# Patient Record
Sex: MALE | Race: WHITE | NOT HISPANIC OR LATINO | Employment: UNEMPLOYED | URBAN - NONMETROPOLITAN AREA
[De-identification: names, ages, dates, MRNs, and addresses within clinical notes are randomized per-mention and may not be internally consistent; named-entity substitution may affect disease eponyms.]

---

## 2019-12-13 ENCOUNTER — APPOINTMENT (EMERGENCY)
Dept: RADIOLOGY | Facility: HOSPITAL | Age: 44
End: 2019-12-13
Payer: COMMERCIAL

## 2019-12-13 ENCOUNTER — HOSPITAL ENCOUNTER (EMERGENCY)
Facility: HOSPITAL | Age: 44
Discharge: HOME | End: 2019-12-14
Attending: EMERGENCY MEDICINE | Admitting: EMERGENCY MEDICINE
Payer: COMMERCIAL

## 2019-12-13 VITALS
HEART RATE: 97 BPM | DIASTOLIC BLOOD PRESSURE: 78 MMHG | WEIGHT: 167.77 LBS | SYSTOLIC BLOOD PRESSURE: 136 MMHG | RESPIRATION RATE: 18 BRPM | OXYGEN SATURATION: 95 % | TEMPERATURE: 96.8 F | HEIGHT: 70 IN | BODY MASS INDEX: 24.02 KG/M2

## 2019-12-13 DIAGNOSIS — M54.50 ACUTE LOW BACK PAIN: Primary | ICD-10-CM

## 2019-12-13 LAB — S PYO DNA THROAT QL NAA+PROBE: NORMAL

## 2019-12-13 PROCEDURE — 72100 X-RAY EXAM L-S SPINE 2/3 VWS: CPT

## 2019-12-13 PROCEDURE — 99284 EMERGENCY DEPT VISIT MOD MDM: CPT | Performed by: EMERGENCY MEDICINE

## 2019-12-13 PROCEDURE — 99284 EMERGENCY DEPT VISIT MOD MDM: CPT

## 2019-12-13 PROCEDURE — 87651 STREP A DNA AMP PROBE: CPT | Performed by: EMERGENCY MEDICINE

## 2019-12-13 RX ORDER — NAPROXEN 500 MG/1
500 TABLET ORAL 2 TIMES DAILY WITH MEALS
Qty: 10 TABLET | Refills: 0 | Status: SHIPPED | OUTPATIENT
Start: 2019-12-13 | End: 2021-07-18

## 2019-12-13 RX ORDER — OXYCODONE HYDROCHLORIDE AND ACETAMINOPHEN 5; 325 MG/1; MG/1
1 TABLET ORAL ONCE
Status: COMPLETED | OUTPATIENT
Start: 2019-12-13 | End: 2019-12-13

## 2019-12-13 RX ORDER — ECHINACEA PURPUREA EXTRACT 125 MG
1 TABLET ORAL ONCE
Status: DISCONTINUED | OUTPATIENT
Start: 2019-12-13 | End: 2019-12-14 | Stop reason: HOSPADM

## 2019-12-13 RX ORDER — NAPROXEN 500 MG/1
500 TABLET ORAL ONCE
Status: COMPLETED | OUTPATIENT
Start: 2019-12-13 | End: 2019-12-13

## 2019-12-13 RX ORDER — TRAMADOL HYDROCHLORIDE 50 MG/1
50 TABLET ORAL EVERY 6 HOURS PRN
Qty: 10 TABLET | Refills: 0 | Status: SHIPPED | OUTPATIENT
Start: 2019-12-13 | End: 2021-07-18

## 2019-12-13 RX ORDER — CYCLOBENZAPRINE HCL 10 MG
10 TABLET ORAL 3 TIMES DAILY PRN
Qty: 30 TABLET | Refills: 0 | Status: SHIPPED | OUTPATIENT
Start: 2019-12-13 | End: 2021-07-18

## 2019-12-13 RX ORDER — LORAZEPAM 1 MG/1
2 TABLET ORAL ONCE
Status: COMPLETED | OUTPATIENT
Start: 2019-12-13 | End: 2019-12-13

## 2019-12-13 RX ORDER — ONDANSETRON 4 MG/1
4 TABLET, ORALLY DISINTEGRATING ORAL ONCE
Status: COMPLETED | OUTPATIENT
Start: 2019-12-13 | End: 2019-12-13

## 2019-12-13 RX ADMIN — OXYCODONE HYDROCHLORIDE AND ACETAMINOPHEN 1 TABLET: 5; 325 TABLET ORAL at 23:17

## 2019-12-13 RX ADMIN — NAPROXEN 500 MG: 500 TABLET ORAL at 23:16

## 2019-12-13 RX ADMIN — ONDANSETRON 4 MG: 4 TABLET, ORALLY DISINTEGRATING ORAL at 23:17

## 2019-12-13 RX ADMIN — LORAZEPAM 2 MG: 1 TABLET ORAL at 23:16

## 2019-12-14 NOTE — ED PROVIDER NOTES
History  Chief Complaint   Patient presents with    Back Pain     back pain started about 2115, stated felt a popping in lower back and fell down to the ground from the standing postion, Denies hitting head  Feels he also has a lump in his throat started about 3 days ago  HPI     Pt presents from home c/o low back pain, midline, no radiation, began while the pt was going from a sitting to standing, felt a "popping" in his back, and he went back down to his knees  No injuries noted  No head trauma or LOC  Pt also with cough, congestion, sore throat and malaise as well  Pt denies ha, fevers, cp, sob, n/v/d/c, abd pain, dysuria, focal def or syncope  Prior to Admission Medications   Prescriptions Last Dose Informant Patient Reported? Taking?   diazepam (VALIUM) 5 mg tablet Not Taking at Unknown time  No No   Sig: Take 1 tablet by mouth every 12 (twelve) hours as needed for muscle spasms for up to 10 days   Patient not taking: Reported on 12/13/2019   naproxen (NAPROSYN) 500 mg tablet   No No   Sig: Take 1 tablet by mouth 2 (two) times a day with meals for 7 days      Facility-Administered Medications: None       Past Medical History:   Diagnosis Date    Kidney disorder     scarring    Sciatica        History reviewed  No pertinent surgical history  History reviewed  No pertinent family history  I have reviewed and agree with the history as documented  Social History     Tobacco Use    Smoking status: Current Every Day Smoker     Packs/day: 1 00    Smokeless tobacco: Former User   Substance Use Topics    Alcohol use: Yes     Comment: socially     Drug use: No        Review of Systems   Constitutional: Negative for activity change, appetite change and fever  HENT: Positive for congestion, rhinorrhea and sore throat  Negative for nosebleeds  Eyes: Negative for photophobia and discharge  Respiratory: Positive for cough  Negative for shortness of breath, wheezing and stridor  Cardiovascular: Negative for chest pain  Gastrointestinal: Negative for abdominal pain, constipation, diarrhea, nausea and vomiting  Endocrine: Negative for cold intolerance and polydipsia  Genitourinary: Negative for discharge, hematuria and penile pain  Musculoskeletal: Positive for back pain  Negative for arthralgias, myalgias and neck stiffness  Skin: Negative for color change and rash  Allergic/Immunologic: Negative for immunocompromised state  Neurological: Negative for dizziness, seizures and headaches  Hematological: Negative for adenopathy  Psychiatric/Behavioral: Negative for confusion and self-injury  The patient is not nervous/anxious  Physical Exam  Physical Exam   Constitutional: He is oriented to person, place, and time  He appears well-developed and well-nourished  No distress  HENT:   Head: Normocephalic and atraumatic  Right Ear: External ear normal    Left Ear: External ear normal    Nose: Nose normal    Mouth/Throat: Oropharynx is clear and moist  No oropharyngeal exudate  Nasal congestion and post-nasal drip  Eyes: Pupils are equal, round, and reactive to light  Conjunctivae and EOM are normal  Right eye exhibits no discharge  Left eye exhibits no discharge  No scleral icterus  Neck: Normal range of motion  Neck supple  No JVD present  No tracheal deviation present  No thyromegaly present  Cardiovascular: Normal rate, regular rhythm, normal heart sounds and intact distal pulses  Exam reveals no gallop and no friction rub  No murmur heard  Pulmonary/Chest: Breath sounds normal  No stridor  No respiratory distress  He has no wheezes  He has no rales  He exhibits no tenderness  Abdominal: Soft  Bowel sounds are normal  He exhibits no distension and no mass  There is no tenderness  There is no rebound and no guarding  Musculoskeletal: Normal range of motion  He exhibits tenderness  He exhibits no edema or deformity          Arms:  Lymphadenopathy:     He has no cervical adenopathy  Neurological: He is alert and oriented to person, place, and time  He has normal reflexes  He displays normal reflexes  No cranial nerve deficit  He exhibits normal muscle tone  Coordination normal    Skin: Skin is warm and dry  No rash noted  He is not diaphoretic  No erythema  No pallor  Psychiatric: He has a normal mood and affect  His behavior is normal  Judgment and thought content normal    Nursing note and vitals reviewed  Vital Signs  ED Triage Vitals [12/13/19 2214]   Temperature Pulse Respirations Blood Pressure SpO2   (!) 96 8 °F (36 °C) 97 18 136/78 95 %      Temp Source Heart Rate Source Patient Position - Orthostatic VS BP Location FiO2 (%)   Temporal Monitor Sitting Right arm --      Pain Score       7           Vitals:    12/13/19 2214   BP: 136/78   Pulse: 97   Patient Position - Orthostatic VS: Sitting         Visual Acuity      ED Medications  Medications   ondansetron (ZOFRAN-ODT) dispersible tablet 4 mg (4 mg Oral Given 12/13/19 2317)   naproxen (NAPROSYN) tablet 500 mg (500 mg Oral Given 12/13/19 2316)   oxyCODONE-acetaminophen (PERCOCET) 5-325 mg per tablet 1 tablet (1 tablet Oral Given 12/13/19 2317)   LORazepam (ATIVAN) tablet 2 mg (2 mg Oral Given 12/13/19 2316)       Diagnostic Studies  Results Reviewed     Procedure Component Value Units Date/Time    Strep A PCR [[id number]]  (Normal) Collected:  12/13/19 2317    Lab Status:  Final result Specimen:  Throat Updated:  12/13/19 2351     STREP A PCR None Detected                 XR spine lumbar 2 or 3 views injury   Final Result by Kaylen Hudson MD (12/14 2488)      No acute osseous abnormality  Degenerative changes as described           Workstation performed: [id number]                    Procedures  Procedures         ED Course                               MDM  Number of Diagnoses or Management Options  Acute low back pain:   Diagnosis management comments: IMP: viral uri, strep throat, lumbar strain versus radiculopathy  Doubt pneumonia, deep space infection, cauda equina or acute neurosurgical process  Plan: check xray lumbar spine, strep screen, give po narcotic, benzo and anti-inflammatory prn   - xray no acute  - strep PCR neg  - Pt with likely viral URI and lumbar strain/pain  Pt is improved and will f/up w/ his pcp  Amount and/or Complexity of Data Reviewed  Clinical lab tests: ordered and reviewed  Tests in the radiology section of CPT®: ordered and reviewed  Tests in the medicine section of CPT®: ordered and reviewed  Decide to obtain previous medical records or to obtain history from someone other than the patient: yes  Review and summarize past medical records: yes  Independent visualization of images, tracings, or specimens: yes    Risk of Complications, Morbidity, and/or Mortality  Presenting problems: high  Diagnostic procedures: low  Management options: low    Patient Progress  Patient progress: improved        Disposition  Final diagnoses:   Acute low back pain     Time reflects when diagnosis was documented in both MDM as applicable and the Disposition within this note     Time User Action Codes Description Comment    12/13/2019 11:46 PM Jesse Hackett Add [M54 5] Acute low back pain       ED Disposition     ED Disposition Condition Date/Time Comment    Discharge Stable Fri Dec 13, 2019 11:46 PM Saray Sunshine discharge to home/self care  Follow-up Information     Follow up With Specialties Details Why Contact Info Additional Information    [address] Schedule an appointment as soon as possible for a visit in 2 days As needed    Return immediately, If symptoms worsen [address] [facility] [place] [address] [place]  [id number]  [phone] [facility], [address] [place], [place], [address]          Discharge Medication List as of 12/13/2019 11:50 PM      START taking these medications    Details cyclobenzaprine (FLEXERIL) 10 mg tablet Take 1 tablet (10 mg total) by mouth 3 (three) times a day as needed for muscle spasms, Starting Fri 12/13/2019, Until Sun 1/12/2020, Normal      traMADol (ULTRAM) 50 mg tablet Take 1 tablet (50 mg total) by mouth every 6 (six) hours as needed for moderate pain for up to 10 doses, Starting Fri 12/13/2019, Normal         CONTINUE these medications which have CHANGED    Details   naproxen (NAPROSYN) 500 mg tablet Take 1 tablet (500 mg total) by mouth 2 (two) times a day with meals for 10 doses, Starting Fri 12/13/2019, Until Wed 12/18/2019, Normal         CONTINUE these medications which have NOT CHANGED    Details   diazepam (VALIUM) 5 mg tablet Take 1 tablet by mouth every 12 (twelve) hours as needed for muscle spasms for up to 10 days, Starting 12/28/2016, Until Sat 1/7/17, Print               ED Provider  Electronically Signed by           Solis Cao DO  12/16/19 1528

## 2019-12-16 ENCOUNTER — TELEPHONE (OUTPATIENT)
Dept: PHYSICAL THERAPY | Facility: OTHER | Age: 44
End: 2019-12-16

## 2019-12-16 NOTE — TELEPHONE ENCOUNTER
Nurse attempted to reach patient and father Wilfredo Paul answered  He is listed as approved contact in EMR  Message to be given to patient along with our contact information for f/u/return call      Referral closed

## 2020-07-22 ENCOUNTER — HOSPITAL ENCOUNTER (EMERGENCY)
Facility: HOSPITAL | Age: 45
Discharge: LEFT BEFORE DISCHARGE | End: 2020-07-22
Attending: EMERGENCY MEDICINE | Admitting: EMERGENCY MEDICINE
Payer: COMMERCIAL

## 2020-07-22 VITALS
SYSTOLIC BLOOD PRESSURE: 136 MMHG | RESPIRATION RATE: 20 BRPM | OXYGEN SATURATION: 96 % | DIASTOLIC BLOOD PRESSURE: 90 MMHG | BODY MASS INDEX: 24.39 KG/M2 | WEIGHT: 170 LBS | HEART RATE: 128 BPM | TEMPERATURE: 98.6 F

## 2020-07-22 DIAGNOSIS — F41.9 ANXIETY: Primary | ICD-10-CM

## 2020-07-22 PROCEDURE — 99281 EMR DPT VST MAYX REQ PHY/QHP: CPT | Performed by: EMERGENCY MEDICINE

## 2020-07-22 PROCEDURE — 99283 EMERGENCY DEPT VISIT LOW MDM: CPT

## 2020-07-23 ENCOUNTER — HOSPITAL ENCOUNTER (EMERGENCY)
Facility: HOSPITAL | Age: 45
Discharge: HOME | End: 2020-07-23
Attending: EMERGENCY MEDICINE | Admitting: EMERGENCY MEDICINE
Payer: COMMERCIAL

## 2020-07-23 VITALS
TEMPERATURE: 97.7 F | BODY MASS INDEX: 24.34 KG/M2 | OXYGEN SATURATION: 93 % | HEIGHT: 70 IN | DIASTOLIC BLOOD PRESSURE: 63 MMHG | SYSTOLIC BLOOD PRESSURE: 113 MMHG | RESPIRATION RATE: 28 BRPM | WEIGHT: 170 LBS | HEART RATE: 91 BPM

## 2020-07-23 DIAGNOSIS — F19.929 DRUG INTOXICATION (HCC): Primary | ICD-10-CM

## 2020-07-23 LAB
ALBUMIN SERPL BCP-MCNC: 3.7 G/DL (ref 3.5–5.7)
ALBUMIN SERPL BCP-MCNC: 4.2 G/DL (ref 3.5–5.7)
ALP SERPL-CCNC: 41 U/L (ref 40–150)
ALP SERPL-CCNC: 48 U/L (ref 40–150)
ALT SERPL W P-5'-P-CCNC: 37 U/L (ref 7–52)
ALT SERPL W P-5'-P-CCNC: 41 U/L (ref 7–52)
AMPHETAMINES SERPL QL SCN: POSITIVE
ANION GAP SERPL CALCULATED.3IONS-SCNC: 11 MMOL/L (ref 4–13)
ANION GAP SERPL CALCULATED.3IONS-SCNC: 6 MMOL/L (ref 4–13)
AST SERPL W P-5'-P-CCNC: 46 U/L (ref 13–39)
AST SERPL W P-5'-P-CCNC: 48 U/L (ref 13–39)
ATRIAL RATE: 113 BPM
BARBITURATES UR QL: NEGATIVE
BASOPHILS # BLD AUTO: 0 THOUSANDS/ΜL (ref 0–0.1)
BASOPHILS NFR BLD AUTO: 0 % (ref 0–2)
BENZODIAZ UR QL: NEGATIVE
BILIRUB SERPL-MCNC: 1.1 MG/DL (ref 0.2–1)
BILIRUB SERPL-MCNC: 1.3 MG/DL (ref 0.2–1)
BUN SERPL-MCNC: 17 MG/DL (ref 7–25)
BUN SERPL-MCNC: 21 MG/DL (ref 7–25)
CALCIUM SERPL-MCNC: 8.2 MG/DL (ref 8.6–10.5)
CALCIUM SERPL-MCNC: 9.1 MG/DL (ref 8.6–10.5)
CHLORIDE SERPL-SCNC: 101 MMOL/L (ref 98–107)
CHLORIDE SERPL-SCNC: 106 MMOL/L (ref 98–107)
CK MB SERPL-MCNC: 1.7 % (ref 0.6–6.3)
CK MB SERPL-MCNC: 1.7 % (ref 0.6–6.3)
CK MB SERPL-MCNC: 11.8 NG/ML (ref 0.6–6.3)
CK MB SERPL-MCNC: 12.6 NG/ML (ref 0.6–6.3)
CK SERPL-CCNC: 705 U/L (ref 30–308)
CK SERPL-CCNC: 730 U/L (ref 30–308)
CO2 SERPL-SCNC: 20 MMOL/L (ref 21–31)
CO2 SERPL-SCNC: 22 MMOL/L (ref 21–31)
COCAINE UR QL: NEGATIVE
CREAT SERPL-MCNC: 1.04 MG/DL (ref 0.7–1.3)
CREAT SERPL-MCNC: 1.42 MG/DL (ref 0.7–1.3)
EOSINOPHIL # BLD AUTO: 0 THOUSAND/ΜL (ref 0–0.61)
EOSINOPHIL NFR BLD AUTO: 0 % (ref 0–5)
ERYTHROCYTE [DISTWIDTH] IN BLOOD BY AUTOMATED COUNT: 15 % (ref 11.5–14.5)
ETHANOL SERPL-MCNC: <10 MG/DL
GFR SERPL CREATININE-BSD FRML MDRD: 60 ML/MIN/1.73SQ M
GFR SERPL CREATININE-BSD FRML MDRD: 88 ML/MIN/1.73SQ M
GLUCOSE SERPL-MCNC: 107 MG/DL (ref 65–99)
GLUCOSE SERPL-MCNC: 137 MG/DL (ref 65–99)
HCT VFR BLD AUTO: 42.6 % (ref 42–47)
HGB BLD-MCNC: 14.8 G/DL (ref 14–18)
LYMPHOCYTES # BLD AUTO: 1 THOUSANDS/ΜL (ref 0.6–4.47)
LYMPHOCYTES NFR BLD AUTO: 14 % (ref 21–51)
MCH RBC QN AUTO: 30.8 PG (ref 26–34)
MCHC RBC AUTO-ENTMCNC: 34.8 G/DL (ref 31–37)
MCV RBC AUTO: 88 FL (ref 81–99)
METHADONE UR QL: NEGATIVE
MONOCYTES # BLD AUTO: 1.1 THOUSAND/ΜL (ref 0.17–1.22)
MONOCYTES NFR BLD AUTO: 16 % (ref 2–12)
NEUTROPHILS # BLD AUTO: 4.8 THOUSANDS/ΜL (ref 1.4–6.5)
NEUTS SEG NFR BLD AUTO: 70 % (ref 42–75)
OPIATES UR QL SCN: POSITIVE
OXYCODONE+OXYMORPHONE UR QL SCN: NEGATIVE
P AXIS: 62 DEGREES
PCP UR QL: NEGATIVE
PLATELET # BLD AUTO: 160 THOUSANDS/UL (ref 149–390)
PMV BLD AUTO: 7.4 FL (ref 8.6–11.7)
POTASSIUM SERPL-SCNC: 3.5 MMOL/L (ref 3.5–5.5)
POTASSIUM SERPL-SCNC: 3.7 MMOL/L (ref 3.5–5.5)
PR INTERVAL: 130 MS
PROT SERPL-MCNC: 6.7 G/DL (ref 6.4–8.9)
PROT SERPL-MCNC: 7.5 G/DL (ref 6.4–8.9)
QRS AXIS: 80 DEGREES
QRSD INTERVAL: 90 MS
QT INTERVAL: 344 MS
QTC INTERVAL: 471 MS
RBC # BLD AUTO: 4.82 MILLION/UL (ref 4.3–5.9)
SARS-COV-2 RNA RESP QL NAA+PROBE: NEGATIVE
SODIUM SERPL-SCNC: 132 MMOL/L (ref 134–143)
SODIUM SERPL-SCNC: 134 MMOL/L (ref 134–143)
T WAVE AXIS: 19 DEGREES
THC UR QL: NEGATIVE
TROPONIN I SERPL-MCNC: 0.03 NG/ML
TROPONIN I SERPL-MCNC: 0.03 NG/ML
VENTRICULAR RATE: 113 BPM
WBC # BLD AUTO: 6.9 THOUSAND/UL (ref 4.8–10.8)

## 2020-07-23 PROCEDURE — 84484 ASSAY OF TROPONIN QUANT: CPT | Performed by: EMERGENCY MEDICINE

## 2020-07-23 PROCEDURE — 96361 HYDRATE IV INFUSION ADD-ON: CPT

## 2020-07-23 PROCEDURE — 80320 DRUG SCREEN QUANTALCOHOLS: CPT | Performed by: EMERGENCY MEDICINE

## 2020-07-23 PROCEDURE — 82553 CREATINE MB FRACTION: CPT | Performed by: EMERGENCY MEDICINE

## 2020-07-23 PROCEDURE — 99284 EMERGENCY DEPT VISIT MOD MDM: CPT | Performed by: EMERGENCY MEDICINE

## 2020-07-23 PROCEDURE — 80307 DRUG TEST PRSMV CHEM ANLYZR: CPT | Performed by: EMERGENCY MEDICINE

## 2020-07-23 PROCEDURE — 80053 COMPREHEN METABOLIC PANEL: CPT | Performed by: EMERGENCY MEDICINE

## 2020-07-23 PROCEDURE — 93010 ELECTROCARDIOGRAM REPORT: CPT | Performed by: INTERNAL MEDICINE

## 2020-07-23 PROCEDURE — 93005 ELECTROCARDIOGRAM TRACING: CPT

## 2020-07-23 PROCEDURE — 87635 SARS-COV-2 COVID-19 AMP PRB: CPT | Performed by: EMERGENCY MEDICINE

## 2020-07-23 PROCEDURE — 82550 ASSAY OF CK (CPK): CPT | Performed by: EMERGENCY MEDICINE

## 2020-07-23 PROCEDURE — 85025 COMPLETE CBC W/AUTO DIFF WBC: CPT | Performed by: EMERGENCY MEDICINE

## 2020-07-23 PROCEDURE — 36415 COLL VENOUS BLD VENIPUNCTURE: CPT | Performed by: EMERGENCY MEDICINE

## 2020-07-23 PROCEDURE — 96360 HYDRATION IV INFUSION INIT: CPT

## 2020-07-23 PROCEDURE — NC001 PR NO CHARGE: Performed by: EMERGENCY MEDICINE

## 2020-07-23 PROCEDURE — 99285 EMERGENCY DEPT VISIT HI MDM: CPT

## 2020-07-23 RX ADMIN — SODIUM CHLORIDE 1000 ML: 0.9 INJECTION, SOLUTION INTRAVENOUS at 03:44

## 2020-07-23 RX ADMIN — SODIUM CHLORIDE 1000 ML: 0.9 INJECTION, SOLUTION INTRAVENOUS at 04:33

## 2020-07-23 NOTE — ED PROCEDURE NOTE
PROCEDURE  ECG 12 Lead Documentation Only  Date/Time: 7/23/2020 3:34 AM  Performed by: Alek Fan MD  Authorized by: Alek Fan MD     Indications / Diagnosis:  Overdose  ECG reviewed by me, the ED Provider: yes    Patient location:  ED  Previous ECG:     Comparison to cardiac monitor: Yes    Interpretation:     Interpretation: non-specific    Rate:     ECG rate:  113    ECG rate assessment: tachycardic    Rhythm:     Rhythm: sinus tachycardia    Ectopy:     Ectopy: none    QRS:     QRS axis:  Normal    QRS intervals:  Normal  Conduction:     Conduction: normal    ST segments:     ST segments:  Non-specific  T waves:     T waves: non-specific           Alek Fan MD  07/23/20 0473

## 2020-07-23 NOTE — ED NOTES
Crisis met with Patient in ED #8  Patient is a 46 y/o male brought to the ED due to aggressive behavior after using meth earlier in yesterday afternoon  Patient stated he believes he accidentally overdosed on meth  He said he used the same amount as he usually does but believes it was stronger than normal   Patient denied any intent of hurting himself  Patient contracted for safety several times throughout the eval   He denied any recent or current suicidal/homicidal ideations and denied any visual/auditory hallucinations  He denies any increase in depression or anxiety  Patient denied any mental health history and denies any legal issues  Patient declined inpatient substance abuse treatment but agreed to receiving outpatient information  Crisis provided Patient with same  Crisis reviewed with ED MD and discharge with outpatient D&A treatment information was agreed upon    Crisis to f/u

## 2020-07-23 NOTE — ED NOTES
Upon arrival to room pt unable to sit still on bed, refusing IV and lab work  Refusing exam by Dr Theodor Peabody  Pt wishes to sign out AMA        [address] Saw Bagley RN  07/22/20 2158

## 2020-07-23 NOTE — ED PROVIDER NOTES
History  Chief Complaint   Patient presents with    Anxiety     pt states "i just got over an anxiety attack and want to get my heart checked " pt pacing in waiting room, unable to sit still on bed upon arrival  denies drug use today      82-year-old male presents with his father for evaluation stating that he had an anxiety attack"  He states that he got angry and began throwing things around his apartment and his father was called to come and pick him up  Patient is pacing in the emergency department waiting room and is not compliant with the nurse when attempting perform his vitals  Patient was combative and pulled away multiple times while attempting get his blood pressure  Patient denies suicidal homicidal ideations  Patient's father is with him at bedside  Patient is awake alert oriented to person place and time  I did specifically ask the patient about past medical history including the fact that he just got out of "Rehab" yesterday  I did ask the patient today if he had done any drugs and he became irate screaming and belligerent towards me  I was able to deescalate and talk the patient down explaining that this was information that I had to have and I was not impeding his character or accusing him of doing drugs  Patient's father became angry that he would not allow us to perform an examination on him  An extensive conversation with the patient's father he states that there was no known trauma to the patient  He is also not sure of the patient did any drugs with the patient not voice suicidal or homicidal ideations to him  Patient refused to get into a gown    Patient ultimately got up off the bed and left the department against medical advice      History provided by:  Patient  Anxiety   Presenting symptoms: aggressive behavior and agitation    Patient accompanied by: Patient's father    Degree of incapacity (severity):  Mild  Onset quality:  Unable to specify  Timing:  Constant  Chronicity: Recurrent  Context: drug abuse    Treatment compliance:  Some of the time  Relieved by:  Nothing  Worsened by:  Nothing  Ineffective treatments:  None tried  Associated symptoms: no abdominal pain, no anhedonia, no anxiety, no appetite change, no chest pain, no decreased need for sleep and not distractible    Risk factors: hx of mental illness        Prior to Admission Medications   Prescriptions Last Dose Informant Patient Reported? Taking? cyclobenzaprine (FLEXERIL) 10 mg tablet   No No   Sig: Take 1 tablet (10 mg total) by mouth 3 (three) times a day as needed for muscle spasms   diazepam (VALIUM) 5 mg tablet   No No   Sig: Take 1 tablet by mouth every 12 (twelve) hours as needed for muscle spasms for up to 10 days   Patient not taking: Reported on 12/13/2019   naproxen (NAPROSYN) 500 mg tablet   No No   Sig: Take 1 tablet (500 mg total) by mouth 2 (two) times a day with meals for 10 doses   traMADol (ULTRAM) 50 mg tablet   No No   Sig: Take 1 tablet (50 mg total) by mouth every 6 (six) hours as needed for moderate pain for up to 10 doses      Facility-Administered Medications: None       Past Medical History:   Diagnosis Date    Kidney disorder     scarring    Sciatica        History reviewed  No pertinent surgical history  History reviewed  No pertinent family history  I have reviewed and agree with the history as documented  E-Cigarette/Vaping     E-Cigarette/Vaping Substances     Social History     Tobacco Use    Smoking status: Current Every Day Smoker     Packs/day: 1 00    Smokeless tobacco: Former User   Substance Use Topics    Alcohol use: Yes     Comment: socially     Drug use: No     Comment: denies drug use        Review of Systems   Constitutional: Positive for activity change  Negative for appetite change  Agitation   HENT: Negative for drooling, ear discharge and facial swelling  Eyes: Negative for discharge and itching     Respiratory: Negative for cough, wheezing and stridor  Cardiovascular: Negative for chest pain  Gastrointestinal: Negative for abdominal pain  Musculoskeletal: Negative for back pain and gait problem  Skin: Negative for color change and pallor  Neurological: Negative for facial asymmetry  Hematological: Negative for adenopathy  Psychiatric/Behavioral: Positive for agitation  The patient is not nervous/anxious  Physical Exam  Physical Exam   Constitutional: He is oriented to person, place, and time  He appears well-developed  He is uncooperative  Physical exam was limited by the fact the patient was combative and refused to get into a gown   HENT:   Head: Normocephalic and atraumatic  Right Ear: External ear normal    Left Ear: External ear normal    Eyes: Pupils are equal, round, and reactive to light  EOM are normal  Right eye exhibits no discharge  Left eye exhibits no discharge  Neck: Normal range of motion  Cardiovascular: Tachycardia present  Pulmonary/Chest: Effort normal    Neurological: He is alert and oriented to person, place, and time  No cranial nerve deficit  He exhibits normal muscle tone  Coordination normal    Skin: Skin is dry  Psychiatric: His affect is angry  He is agitated  He expresses impulsivity  He exhibits normal recent memory         Vital Signs  ED Triage Vitals [07/22/20 2246]   Temperature Pulse Respirations Blood Pressure SpO2   98 6 °F (37 °C) (!) 128 20 136/90 96 %      Temp Source Heart Rate Source Patient Position - Orthostatic VS BP Location FiO2 (%)   Temporal Monitor Lying Left arm --      Pain Score       --           Vitals:    07/22/20 2246   BP: 136/90   Pulse: (!) 128   Patient Position - Orthostatic VS: Lying         Visual Acuity      ED Medications  Medications - No data to display    Diagnostic Studies  Results Reviewed     None                 No orders to display              Procedures  Procedures         ED Course                                             MDM  Number of Diagnoses or Management Options  Anxiety:   Diagnosis management comments: Patient ultimately did sign against medical advice  We were able to have a conversation while the patient was coherent and awake alert and oriented to person place time and surroundings  Patient refused any further intervention  He states he just came here because he wanted have his heart checked   He refused EKG  He refused IV fluids  He refused to get into a gown  He was belligerent and combative towards the nurse and myself while we are attempting to get vital signs  Disposition  Final diagnoses:   Anxiety     Time reflects when diagnosis was documented in both MDM as applicable and the Disposition within this note     Time User Action Codes Description Comment    7/22/2020 10:56 PM Natalie Macias Add [F41 9] Anxiety       ED Disposition     ED Disposition Condition Date/Time Comment    AMA  Wed Jul 22, 2020 10:57 PM Date: 7/22/2020  Patient: Charlie Damon  Admitted: 7/22/2020 10:43 PM  Attending Provider: Dyan Newton DO    Charlie Damon or his authorized caregiver has made the decision for the patient to leave the emergency department against the advice of h is attending physician  He or his authorized caregiver has been informed and understands the inherent risks, including death,   He or his authorized caregiver has decided to accept the responsibility for this decision  Charlie Damon and all necessary  parties have been advised that he may return for further evaluation or treatment   His condition at time of discharge was stable  Charlie Damon had current vital signs as follows:  /90 (BP Location: Left arm)   Pulse (!) 128   Temp 98 6 °F (3 7 °C) (Temporal)   Resp 20   Wt 77 1 kg (170 lb)         Follow-up Information    None         Discharge Medication List as of 7/22/2020 10:57 PM      CONTINUE these medications which have NOT CHANGED    Details   cyclobenzaprine (FLEXERIL) 10 mg tablet Take 1 tablet (10 mg total) by mouth 3 (three) times a day as needed for muscle spasms, Starting Fri 12/13/2019, Until Sun 1/12/2020, Normal      diazepam (VALIUM) 5 mg tablet Take 1 tablet by mouth every 12 (twelve) hours as needed for muscle spasms for up to 10 days, Starting 12/28/2016, Until Sat 1/7/17, Print      naproxen (NAPROSYN) 500 mg tablet Take 1 tablet (500 mg total) by mouth 2 (two) times a day with meals for 10 doses, Starting Fri 12/13/2019, Until Wed 12/18/2019, Normal      traMADol (ULTRAM) 50 mg tablet Take 1 tablet (50 mg total) by mouth every 6 (six) hours as needed for moderate pain for up to 10 doses, Starting Fri 12/13/2019, Normal           No discharge procedures on file      PDMP Review     None          ED Provider  Electronically Signed by           Rubi Stafford DO  07/22/20 8369

## 2020-07-23 NOTE — ED NOTES
Spoke with mother Lasha Figueroa  She will be picking the patient up from the ED in approximately 2 hours  Mother reports that the patient has a potential rehab bed at a facility today or Saturday  She wasn't sure  She states that the patient is not violent but she is concerned for his safety when he is high because he is always thrashing around and hard to control        Surinder Kaur RN  07/23/20 5318

## 2020-07-23 NOTE — ED PROVIDER NOTES
History  Chief Complaint   Patient presents with    Psychiatric Evaluation     brought in as 89 by police      [id number] male  Known meth abuser  Abuse meth sometime this afternoon  Was taken to [facility]  EMS reports that patient was sent away from their, her family's description what happened    EMS was called for says combativeness    Patient has no current complaints, though he is still intoxicated on what is obviously methamphetamines      History provided by:  Patient  Overdose - Accidental   Ingested substance:  Illicit drugs  Illicit drug type: Other  Associated symptoms: agitation    Associated symptoms: no abdominal pain, no altered mental status, no chest pain, no cough, no diaphoresis, no diarrhea, no headaches, no lethargy, no nausea, no shortness of breath, no slurred speech and no vomiting        Prior to Admission Medications   Prescriptions Last Dose Informant Patient Reported? Taking? cyclobenzaprine (FLEXERIL) 10 mg tablet   No No   Sig: Take 1 tablet (10 mg total) by mouth 3 (three) times a day as needed for muscle spasms   diazepam (VALIUM) 5 mg tablet   No No   Sig: Take 1 tablet by mouth every 12 (twelve) hours as needed for muscle spasms for up to 10 days   Patient not taking: Reported on 12/13/2019   naproxen (NAPROSYN) 500 mg tablet   No No   Sig: Take 1 tablet (500 mg total) by mouth 2 (two) times a day with meals for 10 doses   traMADol (ULTRAM) 50 mg tablet Not Taking at Unknown time  No No   Sig: Take 1 tablet (50 mg total) by mouth every 6 (six) hours as needed for moderate pain for up to 10 doses   Patient not taking: Reported on 7/23/2020      Facility-Administered Medications: None       Past Medical History:   Diagnosis Date    Kidney disorder     scarring    Sciatica        History reviewed  No pertinent surgical history  History reviewed  No pertinent family history  I have reviewed and agree with the history as documented      E-Cigarette/Vaping    E-Cigarette Use Never User      E-Cigarette/Vaping Substances     Social History     Tobacco Use    Smoking status: Current Every Day Smoker     Packs/day: 1 00    Smokeless tobacco: Former User   Substance Use Topics    Alcohol use: Yes     Comment: socially     Drug use: Yes     Types: Methamphetamines     Comment:         Review of Systems   Constitutional: Negative for diaphoresis  Respiratory: Negative for cough and shortness of breath  Cardiovascular: Negative for chest pain  Gastrointestinal: Negative for abdominal pain, diarrhea, nausea and vomiting  Neurological: Negative for headaches  Psychiatric/Behavioral: Positive for agitation  All other systems reviewed and are negative  Physical Exam  Physical Exam   Constitutional: He is oriented to person, place, and time  He appears well-developed and well-nourished  No distress  HENT:   Head: Normocephalic and atraumatic  Nose: Nose normal    Mouth/Throat: Oropharynx is clear and moist  No oropharyngeal exudate  Eyes: Pupils are equal, round, and reactive to light  Conjunctivae and EOM are normal  Right eye exhibits no discharge  Left eye exhibits no discharge  No scleral icterus  Neck: Normal range of motion  Neck supple  No JVD present  No tracheal deviation present  Cardiovascular: Normal rate, regular rhythm, normal heart sounds and intact distal pulses  Exam reveals no gallop and no friction rub  No murmur heard  Pulmonary/Chest: Effort normal and breath sounds normal  No stridor  No respiratory distress  He has no wheezes  He has no rales  He exhibits no tenderness  Abdominal: Soft  Bowel sounds are normal  He exhibits no distension and no mass  There is no tenderness  There is no rebound and no guarding  No hernia  Musculoskeletal: Normal range of motion  He exhibits no edema, tenderness or deformity  Lymphadenopathy:     He has no cervical adenopathy     Neurological: He is alert and oriented to person, place, and time  No cranial nerve deficit or sensory deficit  He exhibits normal muscle tone  Coordination normal    Skin: Skin is warm  Capillary refill takes less than 2 seconds  No rash noted  He is not diaphoretic  No erythema  No pallor  Psychiatric: His behavior is normal  Judgment and thought content normal    Mild agitated delirium       Vital Signs  ED Triage Vitals   Temperature Pulse Respirations Blood Pressure SpO2   07/23/20 0404 07/23/20 0400 07/23/20 0400 07/23/20 0404 07/23/20 0400   97 7 °F (36 5 °C) 91 15 113/63 95 %      Temp Source Heart Rate Source Patient Position - Orthostatic VS BP Location FiO2 (%)   07/23/20 0404 07/23/20 0404 07/23/20 0404 07/23/20 0404 --   Temporal Monitor Lying Left arm       Pain Score       --                  Vitals:    07/23/20 0701 07/23/20 0730 07/23/20 0800 07/23/20 0830   BP: 115/63 115/68 104/59 113/63   Pulse: 80 91 92 91   Patient Position - Orthostatic VS:             Visual Acuity      ED Medications  Medications   sodium chloride 0 9 % bolus 1,000 mL (0 mL Intravenous Stopped 7/23/20 0433)   sodium chloride 0 9 % bolus 1,000 mL (0 mL Intravenous Stopped 7/23/20 0518)       Diagnostic Studies  Results Reviewed     Procedure Component Value Units Date/Time    Rapid drug screen, urine [[id number]]  (Abnormal) Collected:  07/23/20 0712    Lab Status:  Final result Specimen:  Urine Updated:  07/23/20 0840     Amph/Meth UR Positive     Barbiturate Ur Negative     Benzodiazepine Urine Negative     Cocaine Urine Negative     Methadone Urine Negative     Opiate Urine Positive     PCP Ur Negative     THC Urine Negative     Oxycodone Urine Negative    Narrative:       Presumptive report  If requested, specimen will be sent to reference lab for confirmation  FOR MEDICAL PURPOSES ONLY  IF CONFIRMATION NEEDED PLEASE CONTACT THE LAB WITHIN 5 DAYS      Drug Screen Cutoff Levels:  AMPHETAMINE/METHAMPHETAMINES  1000 ng/mL  BARBITURATES     200 ng/mL  BENZODIAZEPINES     200 ng/mL  COCAINE      300 ng/mL  METHADONE      300 ng/mL  OPIATES      300 ng/mL  PHENCYCLIDINE     25 ng/mL  THC       50 ng/mL  OXYCODONE      100 ng/mL    CKMB [[id number]]  (Abnormal) Collected:  07/23/20 0645    Lab Status:  Final result Specimen:  Blood from Arm, Right Updated:  07/23/20 0750     CK-MB Index 1 7 %      CK-MB 11 8 ng/mL     CK Total with Reflex CKMB [[id number]]  (Abnormal) Collected:  07/23/20 0645    Lab Status:  Final result Specimen:  Blood from Arm, Right Updated:  07/23/20 0725     Total  U/L     Comprehensive metabolic panel [[id number]]  (Abnormal) Collected:  07/23/20 0645    Lab Status:  Final result Specimen:  Blood from Arm, Right Updated:  07/23/20 0725     Sodium 134 mmol/L      Potassium 3 7 mmol/L      Chloride 106 mmol/L      CO2 22 mmol/L      ANION GAP 6 mmol/L      BUN 17 mg/dL      Creatinine 1 04 mg/dL      Glucose 107 mg/dL      Calcium 8 2 mg/dL      AST 46 U/L      ALT 37 U/L      Alkaline Phosphatase 41 U/L      Total Protein 6 7 g/dL      Albumin 3 7 g/dL      Total Bilirubin 1 10 mg/dL      eGFR 88 ml/min/1 73sq m     Narrative:       [place] guidelines for Chronic Kidney Disease (CKD):     Stage 1 with normal or high GFR (GFR > 90 mL/min/1 73 square meters)    Stage 2 Mild CKD (GFR = 60-89 mL/min/1 73 square meters)    Stage 3A Moderate CKD (GFR = 45-59 mL/min/1 73 square meters)    Stage 3B Moderate CKD (GFR = 30-44 mL/min/1 73 square meters)    Stage 4 Severe CKD (GFR = 15-29 mL/min/1 73 square meters)    Stage 5 End Stage CKD (GFR <15 mL/min/1 73 square meters)  Note: GFR calculation is accurate only with a steady state creatinine    Troponin I [[id number]]  (Normal) Collected:  07/23/20 0645    Lab Status:  Final result Specimen:  Blood from Arm, Right Updated:  07/23/20 0710     Troponin I 0 03 ng/mL     Novel Coronavirus (Covid-19),PCR [facility] [[id number]]  (Normal) Collected:  07/23/20 0341    Lab Status:  Final result Specimen: Nares from Nasopharyngeal Swab Updated:  07/23/20 0456     SARS-CoV-2 Negative    Narrative: The specimen collection materials, transport medium, and/or testing methodology utilized in the production of these test results have been proven to be reliable in a limited validation with an abbreviated program under the Emergency Utilization Authorization provided by the FDA  Testing reported as "Presumptive positive" will be confirmed with secondary testing with a reference laboratory to ensure result accuracy  Clinical caution and judgement should be used with the interpretation of these results with consideration of the clinical impression and other laboratory testing  Testing reported as "Positive" or "Negative" has been proven to be accurate according to standard laboratory validation requirements  All testing is performed with control materials showing appropriate reactivity at standard intervals        CKMB [[id number]]  (Abnormal) Collected:  07/23/20 0333    Lab Status:  Final result Specimen:  Blood from Arm, Right Updated:  07/23/20 0430     CK-MB Index 1 7 %      CK-MB 12 6 ng/mL     Troponin I [[id number]]  (Normal) Collected:  07/23/20 0333    Lab Status:  Final result Specimen:  Blood from Arm, Right Updated:  07/23/20 0407     Troponin I 0 03 ng/mL     Ethanol [[id number]]  (Normal) Collected:  07/23/20 0333    Lab Status:  Final result Specimen:  Blood from Arm, Right Updated:  07/23/20 0406     Ethanol Lvl <10 mg/dL     Comprehensive metabolic panel [[id number]]  (Abnormal) Collected:  07/23/20 0333    Lab Status:  Final result Specimen:  Blood from Arm, Right Updated:  07/23/20 0406     Sodium 132 mmol/L      Potassium 3 5 mmol/L      Chloride 101 mmol/L      CO2 20 mmol/L      ANION GAP 11 mmol/L      BUN 21 mg/dL      Creatinine 1 42 mg/dL      Glucose 137 mg/dL      Calcium 9 1 mg/dL      AST 48 U/L      ALT 41 U/L      Alkaline Phosphatase 48 U/L      Total Protein 7 5 g/dL      Albumin 4 2 g/dL Total Bilirubin 1 30 mg/dL      eGFR 60 ml/min/1 73sq m     Narrative:       National Kidney Disease Foundation guidelines for Chronic Kidney Disease (CKD):     Stage 1 with normal or high GFR (GFR > 90 mL/min/1 73 square meters)    Stage 2 Mild CKD (GFR = 60-89 mL/min/1 73 square meters)    Stage 3A Moderate CKD (GFR = 45-59 mL/min/1 73 square meters)    Stage 3B Moderate CKD (GFR = 30-44 mL/min/1 73 square meters)    Stage 4 Severe CKD (GFR = 15-29 mL/min/1 73 square meters)    Stage 5 End Stage CKD (GFR <15 mL/min/1 73 square meters)  Note: GFR calculation is accurate only with a steady state creatinine    CK Total with Reflex CKMB [[id number]]  (Abnormal) Collected:  07/23/20 0333    Lab Status:  Final result Specimen:  Blood from Arm, Right Updated:  07/23/20 0406     Total  U/L     CBC and differential [[id number]]  (Abnormal) Collected:  07/23/20 0333    Lab Status:  Final result Specimen:  Blood from Arm, Right Updated:  07/23/20 0350     WBC 6 90 Thousand/uL      RBC 4 82 Million/uL      Hemoglobin 14 8 g/dL      Hematocrit 42 6 %      MCV 88 fL      MCH 30 8 pg      MCHC 34 8 g/dL      RDW 15 0 %      MPV 7 4 fL      Platelets 454 Thousands/uL      Neutrophils Relative 70 %      Lymphocytes Relative 14 %      Monocytes Relative 16 %      Eosinophils Relative 0 %      Basophils Relative 0 %      Neutrophils Absolute 4 80 Thousands/µL      Lymphocytes Absolute 1 00 Thousands/µL      Monocytes Absolute 1 10 Thousand/µL      Eosinophils Absolute 0 00 Thousand/µL      Basophils Absolute 0 00 Thousands/µL                  No orders to display              Procedures  Procedures         ED Course  ED Course as of Jul 23 1941   Thu Jul 23, 2020   0319 Patient was placed in 4 point wrist restraints when moved from EMS litter to ED bed          0328 I had a long discussion with the patient's parents    They acknowledge the history:  Patient has been agitated, back on meth  He was recently in for a rehab stay at [facility]  Parents would like the patient to go back there  They understand that this will be his decision when he is sober      0330 Patient remains stable      0422 MEDICAL ALCOHOL: <10   0422 Total CK(!): 730   0422 Troponin I: 0 03   0422 Hemoglobin: 14 8   0422 Platelet Count: 732   0422 Lymphocytes Relative(!): 14   0422 Neutrophils %: 70   0422 Monocytes Relative(!): 16   0423 Eosinophils: 0   0423 Sodium(!): 132   0423 Potassium: 3 5   0423 Chloride: 101   0423 CO2(!): 20   0423 Anion Gap: 11   0423 BUN: 21   0423 Creatinine(!): 1 42   0423 Glucose, Random(!): 137   0423 AST(!): 48   0423 ALT: 41   0423 Alkaline Phosphatase: 48   0423 TOTAL BILIRUBIN(!): 1 30   0658 Sign out:     D/ w Dr Marcus Colunga  Will f/u w/ Crisis evaluation and assist w/ disposition of the pt             US AUDIT      Most Recent Value   Initial Alcohol Screen: US AUDIT-C    1  How often do you have a drink containing alcohol?  0 [denied ] Filed at: 07/23/2020 0409   2  How many drinks containing alcohol do you have on a typical day you are drinking? 0 Filed at: 07/23/2020 0409   3a  Male UNDER 65: How often do you have five or more drinks on one occasion? 0 Filed at: 07/23/2020 0409   Audit-C Score  0 Filed at: 07/23/2020 0409            HEART Risk Score      Most Recent Value   Heart Score Risk Calculator   History  0 Filed at: 07/23/2020 0341   ECG  1 Filed at: 07/23/2020 0341   Age  0 Filed at: 07/23/2020 0341   Risk Factors  1 Filed at: 07/23/2020 0341   Troponin  0 Filed at: 07/23/2020 0341   HEART Score  2 Filed at: 07/23/2020 0341            EVELIN/DAST-10      Most Recent Value   How many times in the past year have you    Used an illegal drug or used a prescription medication for non-medical reasons? Daily or Almost Daily [admits to using meth but states that it is not daily ] Filed at: 07/23/2020 0410   In the past 12 months      1  Have you used drugs other than those required for medical reasons?   1 Filed at: 07/23/2020 0410   2  Do you use more than one drug at a time? 1 Filed at: 07/23/2020 0410   3  Have you had medical problems as a result of your drug use (e g , memory loss, hepatitis, convulsions, bleeding, etc )? 0 Filed at: 07/23/2020 0410   4  Have you had "blackouts" or "flashbacks" as a result of drug use? YesNo  0 Filed at: 07/23/2020 0410   5  Do you ever feel bad or guilty about your drug use? 0 Filed at: 07/23/2020 0410   6  Does your spouse (or parent) ever complain about your involvement with drugs? 1 Filed at: 07/23/2020 0410   7  Have you neglected your family because of your use of drugs? 0 Filed at: 07/23/2020 0410   8  Have you engaged in illegal activities in order to obtain drugs? 0 Filed at: 07/23/2020 0410   9  Have you ever experienced withdrawal symptoms (felt sick) when you stopped taking drugs? 0 Filed at: 07/23/2020 0410   10  Are you always able to stop using drugs when you want to?  0 Filed at: 07/23/2020 0410   DAST-10 Score  3 Filed at: 07/23/2020 0410                                MDM      Disposition  Final diagnoses:   Drug intoxication ([address] )     Time reflects when diagnosis was documented in both MDM as applicable and the Disposition within this note     Time User Action Codes Description Comment    7/23/2020  8:50 AM Nina Wong Add [M24 [id number]] Drug intoxication [facility])       ED Disposition     ED Disposition Condition Date/Time Comment    Discharge Stable u Jul 23, 2020  8:50 AM Ken Crouch discharge to home/self care              MD Documentation      Most Recent Value   Sending MD Dr Thom Cisneros up With Specialties Details Why [id number] [facility]  Emergency Department Emergency Medicine Go to  If symptoms worsen, if you thoughts of hurting yourself or others 0 [facility] [id number]  [phone]          Discharge Medication List as of 7/23/2020  8:51 AM      CONTINUE these medications which have NOT CHANGED    Details   cyclobenzaprine (FLEXERIL) 10 mg tablet Take 1 tablet (10 mg total) by mouth 3 (three) times a day as needed for muscle spasms, Starting Fri 12/13/2019, Until Sun 1/12/2020, Normal      diazepam (VALIUM) 5 mg tablet Take 1 tablet by mouth every 12 (twelve) hours as needed for muscle spasms for up to 10 days, Starting 12/28/2016, Until Sat 1/7/17, Print      naproxen (NAPROSYN) 500 mg tablet Take 1 tablet (500 mg total) by mouth 2 (two) times a day with meals for 10 doses, Starting Fri 12/13/2019, Until Wed 12/18/2019, Normal      traMADol (ULTRAM) 50 mg tablet Take 1 tablet (50 mg total) by mouth every 6 (six) hours as needed for moderate pain for up to 10 doses, Starting Fri 12/13/2019, Normal           No discharge procedures on file      PDMP Review     None          ED Provider  Electronically Signed by           Shanna Taveras MD  07/23/20 Robbi Banegas MD  07/23/20 [id number]

## 2020-07-23 NOTE — ED NOTES
Crisis spoke to [place] with [facility] @ [phone]  She confirmed there was no 302 petitioned by Patient's father  Crisis to f/u

## 2021-03-05 ENCOUNTER — HOSPITAL ENCOUNTER (EMERGENCY)
Facility: HOSPITAL | Age: 46
Discharge: HOME | End: 2021-03-05
Attending: EMERGENCY MEDICINE | Admitting: EMERGENCY MEDICINE
Payer: COMMERCIAL

## 2021-03-05 VITALS
BODY MASS INDEX: 28.85 KG/M2 | SYSTOLIC BLOOD PRESSURE: 137 MMHG | TEMPERATURE: 97.9 F | WEIGHT: 201.5 LBS | DIASTOLIC BLOOD PRESSURE: 83 MMHG | RESPIRATION RATE: 14 BRPM | OXYGEN SATURATION: 99 % | HEIGHT: 70 IN | HEART RATE: 72 BPM

## 2021-03-05 DIAGNOSIS — Z20.822 ENCOUNTER FOR SCREENING LABORATORY TESTING FOR COVID-19 VIRUS: Primary | ICD-10-CM

## 2021-03-05 LAB
FLUAV RNA RESP QL NAA+PROBE: NEGATIVE
FLUBV RNA RESP QL NAA+PROBE: NEGATIVE
RSV RNA RESP QL NAA+PROBE: NEGATIVE
SARS-COV-2 RNA RESP QL NAA+PROBE: NEGATIVE

## 2021-03-05 PROCEDURE — 99282 EMERGENCY DEPT VISIT SF MDM: CPT

## 2021-03-05 PROCEDURE — 0241U HB NFCT DS VIR RESP RNA 4 TRGT: CPT | Performed by: EMERGENCY MEDICINE

## 2021-03-05 PROCEDURE — 99282 EMERGENCY DEPT VISIT SF MDM: CPT | Performed by: EMERGENCY MEDICINE

## 2021-03-05 NOTE — LETTER
To Whom It May Concern:      Please let this note serve as a confirmation that Jabier Anderson ( 1975) tested negative for Covid-19 as of 21  Please do not hesitate to contact me for further questions        Sincerely,      Yareli Hendrickson MD

## 2021-03-11 NOTE — ED PROVIDER NOTES
EMERGENCY DEPARTMENT ENCOUNTER NOTE    This note has been generated using a voice recognition software  There may be typographic, grammatic, or word substitution errors that have escaped editorial review  ? CHIEF COMPLAINT  Chief Complaint   Patient presents with    Labs Only     Patient is getting into a tx faciltiy and needs to be tested for COVID before he can enter the facility  DEEPAK Encarnacion is a 39 y o  male with PMH of hepatitis-C presenting with request for COVID-19 testing  Patient reports battling addiction with methamphetamines and would like to enter inpatient rehab  He already has a rehab picked out but has been notified that he needs to have COVID-19 test performed prior to being accepted  Patient denies any symptoms: He has not had any fevers or chills, no rhinorrhea, no sore throat, no cough, no shortness of breath, no nausea, vomiting, or diarrhea  He reports over the having contact with a rehab and declines are assistance for warm handoff today  REVIEW OF SYSTEMS  Constitutional: ?Denies fevers, chills  Eyes: No changes in vision  ENT: No runny nose, no sore throat  CV: No chest pain   Resp: No  shortness of breath or coughing  GI: No abdominal pain, vomiting, or diarrhea  Skin: No new rashes  Neuro: No headaches  Ten systems were reviewed otherwise were unremarkable    PAST MEDICAL HISTORY  Past Medical History:   Diagnosis Date    Hepatitis C virus     Kidney disorder     scarring    Sciatica        SURGICAL HISTORY  History reviewed  No pertinent surgical history  FAMILY HISTORY  History reviewed  No pertinent family history  CURRENT MEDICATIONS  No current facility-administered medications on file prior to encounter        Current Outpatient Medications on File Prior to Encounter   Medication Sig    cyclobenzaprine (FLEXERIL) 10 mg tablet Take 1 tablet (10 mg total) by mouth 3 (three) times a day as needed for muscle spasms    diazepam (VALIUM) 5 mg tablet Take 1 tablet by mouth every 12 (twelve) hours as needed for muscle spasms for up to 10 days (Patient not taking: Reported on 12/13/2019)    naproxen (NAPROSYN) 500 mg tablet Take 1 tablet (500 mg total) by mouth 2 (two) times a day with meals for 10 doses    traMADol (ULTRAM) 50 mg tablet Take 1 tablet (50 mg total) by mouth every 6 (six) hours as needed for moderate pain for up to 10 doses (Patient not taking: Reported on 7/23/2020)       ALLERGIES  No Known Allergies    SOCIAL HISTORY  Social History     Socioeconomic History    Marital status: Single     Spouse name: None    Number of children: None    Years of education: None    Highest education level: None   Occupational History    None   Social Needs    Financial resource strain: None    Food insecurity     Worry: None     Inability: None    Transportation needs     Medical: None     Non-medical: None   Tobacco Use    Smoking status: Current Every Day Smoker     Packs/day: 1 00    Smokeless tobacco: Former User   Substance and Sexual Activity    Alcohol use: Yes     Comment: socially     Drug use: Yes     Types: Methamphetamines, Heroin     Comment: this morning       Sexual activity: None   Lifestyle    Physical activity     Days per week: None     Minutes per session: None    Stress: None   Relationships    Social connections     Talks on phone: None     Gets together: None     Attends Buddhist service: None     Active member of club or organization: None     Attends meetings of clubs or organizations: None     Relationship status: None    Intimate partner violence     Fear of current or ex partner: None     Emotionally abused: None     Physically abused: None     Forced sexual activity: None   Other Topics Concern    None   Social History Narrative    None       PHYSICAL EXAM    /83 (BP Location: Right arm)   Pulse 72   Temp 97 9 °F (36 6 °C) (Temporal)   Resp 14   Ht 5' 10" (1 778 m)   Wt 91 4 kg (201 lb 8 oz)   SpO2 99% BMI 28 91 kg/m²   Vital signs and nursing notes reviewed    CONSTITUTIONAL: male appearing stated age resting in bed, in no acute distress  HEENT: atraumatic, normocephalic  Sclera anicteric, conjunctiva are not injected  Moist oral mucosa  CARDIOVASCULAR/CHEST: RRR, no M/R/G  2+ radial pulses  PULMONARY: Breathing comfortably on RA  Breath sounds are equal and clear to auscultation  ABDOMEN: non-distended  MSK: moves all extremities, no deformities, no peripheral edema  NEURO: Awake, alert, and oriented x 3  Face symmetric  Moves all extremities spontaneously  No focal neurologic deficits  SKIN: Warm, appears well-perfused  MENTAL STATUS: Normal affect      ? LABS AND TESTS    Results Reviewed     Procedure Component Value Units Date/Time    COVID19, [facility] A/B, [id number]  Collected: 03/05/21 1404       No orders to display       ED COURSE & MEDICAL DECISION MAKING  Procedures             Medications - No data to display    51-year-old gentleman asymptomatic from COVID-19 standpoint presenting with request for COVID-19 testing prior to entering inpatient rehab  Vital signs reviewed, afebrile and within normal limits  COVID-19 swab obtained and sent  We will notify the patient of the results of the test and provide him with a note documenting the same, once the results are available later today  Patient discharged to home with return precautions and plan for follow-up         MDM  Number of Diagnoses or Management Options  Encounter for screening laboratory testing for COVID-19 virus: new and requires workup     Amount and/or Complexity of Data Reviewed  Clinical lab tests: ordered    Risk of Complications, Morbidity, and/or Mortality  Presenting problems: low  Diagnostic procedures: moderate  Management options: low    Patient Progress  Patient progress: stable      CLINICAL IMPRESSION  Final diagnoses:   Encounter for screening laboratory testing for COVID-19 virus DISPOSITION  Time reflects when diagnosis was documented in both MDM as applicable and the Disposition within this note     Time User Action Codes Description Comment    3/5/2021  2:00 PM Frandy Xiao Add [[id number]] Encounter for screening laboratory testing for COVID-19 virus       ED Disposition     ED Disposition Condition Date/Time Comment    Discharge Stable Fri Mar 5, 2021  2:00 PM Vahid Galvez discharge to home/self care              Follow-up Information     Follow up With Specialties Details [facility] Go to  As needed, If symptoms worsen äne  [id number]  [facility] Emergency Department, [address], [address]   Discharge Medication List as of 3/5/2021  2:01 PM      CONTINUE these medications which have NOT CHANGED    Details   cyclobenzaprine (FLEXERIL) 10 mg tablet Take 1 tablet (10 mg total) by mouth 3 (three) times a day as needed for muscle spasms, Starting Fri 12/13/2019, Until Sun 1/12/2020, Normal      diazepam (VALIUM) 5 mg tablet Take 1 tablet by mouth every 12 (twelve) hours as needed for muscle spasms for up to 10 days, Starting 12/28/2016, Until Sat 1/7/17, Print      naproxen (NAPROSYN) 500 mg tablet Take 1 tablet (500 mg total) by mouth 2 (two) times a day with meals for 10 doses, Starting Fri 12/13/2019, Until Wed 12/18/2019, Normal      traMADol (ULTRAM) 50 mg tablet Take 1 tablet (50 mg total) by mouth every 6 (six) hours as needed for moderate pain for up to 10 doses, Starting Fri 12/13/2019, Normal             MD Remedios Rose MD  03/10/21 1949

## 2021-07-18 ENCOUNTER — HOSPITAL ENCOUNTER (EMERGENCY)
Facility: HOSPITAL | Age: 46
Discharge: HOME | End: 2021-07-18
Attending: EMERGENCY MEDICINE
Payer: COMMERCIAL

## 2021-07-18 VITALS
SYSTOLIC BLOOD PRESSURE: 134 MMHG | HEART RATE: 102 BPM | BODY MASS INDEX: 30.37 KG/M2 | RESPIRATION RATE: 18 BRPM | TEMPERATURE: 97.8 F | DIASTOLIC BLOOD PRESSURE: 78 MMHG | WEIGHT: 211.64 LBS | OXYGEN SATURATION: 96 %

## 2021-07-18 DIAGNOSIS — L03.116 CELLULITIS OF LEFT LOWER EXTREMITY: Primary | ICD-10-CM

## 2021-07-18 PROCEDURE — 99284 EMERGENCY DEPT VISIT MOD MDM: CPT | Performed by: EMERGENCY MEDICINE

## 2021-07-18 PROCEDURE — 99281 EMR DPT VST MAYX REQ PHY/QHP: CPT

## 2021-07-18 RX ORDER — SULFAMETHOXAZOLE AND TRIMETHOPRIM 800; 160 MG/1; MG/1
1 TABLET ORAL 2 TIMES DAILY
Qty: 14 TABLET | Refills: 0 | Status: SHIPPED | OUTPATIENT
Start: 2021-07-18 | End: 2021-07-25

## 2021-07-18 RX ORDER — SULFAMETHOXAZOLE AND TRIMETHOPRIM 800; 160 MG/1; MG/1
1 TABLET ORAL ONCE
Status: COMPLETED | OUTPATIENT
Start: 2021-07-18 | End: 2021-07-18

## 2021-07-18 RX ADMIN — SULFAMETHOXAZOLE AND TRIMETHOPRIM 1 TABLET: 800; 160 TABLET ORAL at 06:49

## 2021-07-18 NOTE — ED PROVIDER NOTES
History  Chief Complaint   Patient presents with   Carlito Birch      patient states "I got bit by a spider to left ankle"  Patient is a 53-year-old male presenting to the emergency department complaining of a spider bite to his left ankle, states he saw a brown spider bite him 3 days ago, now has redness and swelling to the ankle, denies a fever, denies any pain or injury elsewhere          None       Past Medical History:   Diagnosis Date    Hepatitis C virus     Kidney disorder     scarring    Sciatica        History reviewed  No pertinent surgical history  History reviewed  No pertinent family history  I have reviewed and agree with the history as documented  E-Cigarette/Vaping    E-Cigarette Use Never User      E-Cigarette/Vaping Substances    Nicotine No     THC No     CBD No     Flavoring No     Other No     Unknown No      Social History     Tobacco Use    Smoking status: Current Every Day Smoker     Packs/day: 1 00    Smokeless tobacco: Former User   Vaping Use    Vaping Use: Never used   Substance Use Topics    Alcohol use: Yes     Comment: socially     Drug use: Yes     Types: Methamphetamines, Heroin       Review of Systems   Constitutional: Negative  HENT: Negative  Eyes: Negative  Respiratory: Negative  Cardiovascular: Negative  Gastrointestinal: Negative  Endocrine: Negative  Genitourinary: Negative  Musculoskeletal: Negative  Skin: Positive for color change and wound  Allergic/Immunologic: Negative  Neurological: Negative  Hematological: Negative  Psychiatric/Behavioral: Negative  Physical Exam  Physical Exam  Constitutional:       Appearance: He is well-developed  HENT:      Head: Normocephalic and atraumatic  Eyes:      Conjunctiva/sclera: Conjunctivae normal       Pupils: Pupils are equal, round, and reactive to light  Cardiovascular:      Rate and Rhythm: Normal rate     Pulmonary:      Effort: Pulmonary effort is normal    Abdominal:      Palpations: Abdomen is soft  Musculoskeletal:         General: Normal range of motion  Cervical back: Normal range of motion and neck supple  Skin:     General: Skin is warm and dry  Comments: Erythema and swelling with increased warmth to the left ankle laterally, full range of motion, sensation intact   Neurological:      Mental Status: He is alert and oriented to person, place, and time  Vital Signs  ED Triage Vitals [07/18/21 0632]   Temperature Pulse Respirations Blood Pressure SpO2   97 8 °F (36 6 °C) (!) 119 18 134/78 96 %      Temp Source Heart Rate Source Patient Position - Orthostatic VS BP Location FiO2 (%)   Temporal -- Lying Right arm --      Pain Score       9           Vitals:    07/18/21 0632   BP: 134/78   Pulse: (!) 119   Patient Position - Orthostatic VS: Lying               ED Medications  Medications   sulfamethoxazole-trimethoprim (BACTRIM DS) 800-160 mg per tablet 1 tablet (has no administration in time range)       Diagnostic Studies  Results Reviewed     None                 No orders to display                     ED Course  ED Course as of Jul 18 0642   Sun Jul 18, 2021   0641 Patient with cellulitis to the left ankle, started on antibiotics, advised to follow-up with PCP as needed, return if symptoms worsen, patient acknowledges understanding and agreement with this plan                                                Disposition  Final diagnoses:   Cellulitis of left lower extremity     Time reflects when diagnosis was documented in both MDM as applicable and the Disposition within this note     Time User Action Codes Description Comment    7/18/2021  6:40 AM Reny Charles Add [[id number]] Cellulitis of left lower extremity       ED Disposition     ED Disposition Condition Date/Time Comment    Discharge Stable Sun Jul 18, 2021  6:40 AM Cherise Tyler discharge to home/self care              Follow-up Information    None         Patient's Medications   Discharge Prescriptions    SULFAMETHOXAZOLE-TRIMETHOPRIM (BACTRIM DS) 800-160 MG PER TABLET    Take 1 tablet by mouth 2 (two) times a day for 7 days smx-tmp DS (BACTRIM) 800-160 mg tabs (1tab q12 D10)       Start Date: 7/18/2021 End Date: 7/25/2021       Order Dose: 1 tablet       Quantity: 14 tablet    Refills: 0     No discharge procedures on file      PDMP Review     None          ED Provider  Electronically Signed by           Luci Hernandez DO  07/18/21 5873